# Patient Record
Sex: MALE | Race: WHITE
[De-identification: names, ages, dates, MRNs, and addresses within clinical notes are randomized per-mention and may not be internally consistent; named-entity substitution may affect disease eponyms.]

---

## 2017-12-20 NOTE — [place]
[facility]
                                    [address], [place]  [address]
_________________________________________________________________________________________
                                                                 Signed   
 
 
DATE OF OPERATION:
12/14/2017
 
SURGEON:
Sandra Zuñiga MD
 
PREOPERATIVE DIAGNOSIS:
Displaced fracture, proximal phalanx, left long finger.
 
POSTOPERATIVE DIAGNOSIS:
Displaced fracture, proximal phalanx, left long finger.
 
PROCEDURE:
Open reduction and internal fixation.
 
ANESTHESIA:
Heavy sedation with digital block.
 
SPECIMENS AND COMPLICATIONS:
There were no specimens or complications.
 
TOURNIQUET TIME:
About 45 minutes.
 
WHAT WAS DONE:
The patient was brought to the operating room.  After he was generously sedated, a 
digital block was performed with 0.25% Marcaine.  The arm was exsanguinated and pneumatic
tourniquet was elevated to 250 mmHg pressure.  A straight dorsal approach was made 
beginning at the MCP joint and extending distally to the PIP joint on the dorsal aspect 
of the third finger.  Skin was divided sharply.  Subcutaneous tissue was bluntly spread. 
The extensor tendon was split in the midline and gently retracted.  The fracture was then
identified and gently reduced under direct vision.  A 1.5 mm T-plate was then placed and 
two of the distal holes were cut off.  We then placed it over the bone and checked 
position fluoroscopically and we were quite happy.  We secured it proximally with several
screws and distally with several screws.  We then checked the rotational alignment of the
finger which appeared to be excellent as did the fluoroscopic appearance on AP, lateral 
and oblique imagery.  The wound was gently irrigated.  The extensor tendon was closed 
with 5-0 nylon and the skin was then closed with 4-0 nylon.  A sterile dressing was 
applied, over which a bulky hand dressing and a splint were placed.  He was awakened to 
the recovery room where arrived in stable condition.  Counts were correct and antibiotic 
protocols were followed.
 
 
    Electronically Signed By: SANDRA ZUÑIGA MD  12/20/17 1325
_________________________________________________________________________________________
PATIENT NAME:     GHASSAN ARANDA                         
MEDICAL RECORD #: [id number]                     OPERATIVE REPORT              
          ACCT #: [id number]  
DATE OF BIRTH:   [birth year]                                       
PHYSICIAN:   SANDRA ZUÑIGA MD           REPORT #: [id number]
REPORT IS CONFIDENTIAL AND NOT TO BE RELEASED WITHOUT AUTHORIZATION
 
 
                                  [address]  [address]
_________________________________________________________________________________________
                                                                 Signed   
 
 
 
 
________________________________________
 
 
Sandra Zuñiga MD 
 
 
[facility]
Job #:  [id number]
DD:  12/14/2017 09:26:40
DT:  12/14/2017 11:30:40
 
 
 
 
 
 
 
 
 
 
 
 
 
 
 
 
 
 
 
 
 
 
 
 
 
 
 
 
 
 
 
    Electronically Signed By: SANDRA ZUÑIGA MD  12/20/17 1325
_________________________________________________________________________________________
PATIENT NAME:     GHASSAN ARANDA                         
MEDICAL RECORD #: [id number]                     OPERATIVE REPORT              
          ACCT #: [id number]  
DATE OF BIRTH:   [birth year]                                       
PHYSICIAN:   SANDRA ZUÑIGA MD           REPORT #: [id number]
REPORT IS CONFIDENTIAL AND NOT TO BE RELEASED WITHOUT AUTHORIZATION

## 2022-09-25 ENCOUNTER — HOSPITAL ENCOUNTER (EMERGENCY)
Dept: HOSPITAL 46 - ED | Age: 32
Discharge: HOME | End: 2022-09-25
Payer: COMMERCIAL

## 2022-09-25 VITALS — HEIGHT: 72 IN | WEIGHT: 210.1 LBS | BODY MASS INDEX: 28.46 KG/M2

## 2022-09-25 DIAGNOSIS — Z23: ICD-10-CM

## 2022-09-25 DIAGNOSIS — S91.332A: Primary | ICD-10-CM
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
## 2022-09-25 DIAGNOSIS — F17.200: ICD-10-CM

## 2025-04-09 ENCOUNTER — HOSPITAL ENCOUNTER (EMERGENCY)
Dept: HOSPITAL 46 - ED | Age: 35
Discharge: ANOTHER HOSPITAL | End: 2025-04-09
Payer: COMMERCIAL

## 2025-04-09 VITALS — HEIGHT: 72 IN | WEIGHT: 263.68 LBS | BODY MASS INDEX: 35.71 KG/M2

## 2025-04-09 VITALS — DIASTOLIC BLOOD PRESSURE: 99 MMHG | SYSTOLIC BLOOD PRESSURE: 151 MMHG

## 2025-04-09 DIAGNOSIS — S02.42XB: ICD-10-CM

## 2025-04-09 DIAGNOSIS — S02.40DB: Primary | ICD-10-CM

## 2025-04-09 DIAGNOSIS — S02.609B: ICD-10-CM

## 2025-04-09 DIAGNOSIS — S02.85XB: ICD-10-CM

## 2025-04-09 DIAGNOSIS — F17.200: ICD-10-CM

## 2025-04-09 LAB
ABO GROUP BLD: (no result)
ALBUMIN SERPL-MCNC: 3.7 G/DL (ref 3.4–5)
ALBUMIN/GLOB SERPL: 1.03 {RATIO} (ref 1.1–2.4)
ALP SERPL-CCNC: 94 U/L (ref 46–116)
ALT SERPL W P-5'-P-CCNC: 44 U/L (ref 14–59)
ANION GAP SERPL CALCULATED.4IONS-SCNC: 8.1 MMOL/L (ref 7–21)
AST SERPL-CCNC: 25 U/L (ref 15–37)
BASOPHILS NFR BLD AUTO: 0.3 % (ref 0–2)
BUN SERPL-MCNC: 15 MG/DL (ref 7–18)
CALCIUM SERPL-MCNC: 9.1 MG/DL (ref 8.5–10.1)
CHLORIDE SERPL-SCNC: 105 MMOL/L (ref 98–107)
CK SERPL-CCNC: 129 U/L (ref 39–308)
CO2 SERPL-SCNC: 28 MMOL/L (ref 21–32)
DEPRECATED RDW RBC AUTO: 13.1 FL (ref 10.5–15)
EGFRCR SERPLBLD CKD-EPI 2021: 103 ML/MIN (ref 60–?)
EOSINOPHIL NFR BLD AUTO: 1.9 % (ref 0–6)
ETHANOL SERPL-SCNC: <3 NG/DL (ref ?–3)
GLOBULIN SER-MCNC: 3.6 G/DL (ref 1.8–3.5)
HCT VFR BLD AUTO: 44.2 % (ref 35–50)
HGB BLD-MCNC: 15.3 G/DL (ref 12–18)
IAT POLY-SP REAG SERPL QL: NEGATIVE
LYMPHOCYTES NFR BLD AUTO: 37.9 % (ref 24–44)
MCH RBC QN AUTO: 31.2 PG (ref 27–36)
MCHC RBC AUTO-ENTMCNC: 34.7 G/DL (ref 30–36)
MCV RBC AUTO: 89.9 FL (ref 81–99)
MONOCYTES NFR BLD AUTO: 6.9 % (ref 0–12)
PLATELET # BLD AUTO: 376 K/UL (ref 140–440)
POTASSIUM SERPL-SCNC: 4.1 MMOL/L (ref 3.5–5.1)
PROT SERPL-MCNC: 7.3 G/DL (ref 6.4–8.2)
RBC # BLD AUTO: 4.91 M/UL (ref 4.3–5.7)
RH BLD: NEGATIVE

## 2025-04-09 PROCEDURE — G0480 DRUG TEST DEF 1-7 CLASSES: HCPCS

## 2025-04-27 ENCOUNTER — HOSPITAL ENCOUNTER (EMERGENCY)
Dept: HOSPITAL 46 - ED | Age: 35
Discharge: HOME | End: 2025-04-27
Payer: COMMERCIAL

## 2025-04-27 VITALS — SYSTOLIC BLOOD PRESSURE: 132 MMHG | DIASTOLIC BLOOD PRESSURE: 92 MMHG

## 2025-04-27 VITALS — WEIGHT: 241.63 LBS | HEIGHT: 72 IN | BODY MASS INDEX: 32.73 KG/M2

## 2025-04-27 DIAGNOSIS — K29.70: Primary | ICD-10-CM

## 2025-04-27 DIAGNOSIS — F17.200: ICD-10-CM

## 2025-04-27 LAB
ALBUMIN SERPL-MCNC: 3.6 G/DL (ref 3.4–5)
ALBUMIN/GLOB SERPL: 0.82 {RATIO} (ref 1.1–2.4)
ANION GAP SERPL CALCULATED.4IONS-SCNC: 13.9 MMOL/L (ref 7–21)
BUN/CREAT SERPL: 12.37 (ref 6–28.6)
CALCIUM SERPL-MCNC: 9.2 MG/DL (ref 8.5–10.1)
DEPRECATED RDW RBC AUTO: 12.5 FL (ref 10.5–15)
EOSINOPHIL NFR BLD AUTO: 0.9 % (ref 0–6)
GLOBULIN SER-MCNC: 4.4 G/DL (ref 1.8–3.5)
HCT VFR BLD AUTO: 37.6 % (ref 35–50)
HGB BLD-MCNC: 13.1 G/DL (ref 12–18)
HGB UR QL STRIP: NEGATIVE
KETONES UR QL STRIP: (no result)
LEUKOCYTE ESTERASE UR QL STRIP: NEGATIVE
LYMPHOCYTES NFR BLD AUTO: 18.9 % (ref 24–44)
MCH RBC QN AUTO: 30.8 PG (ref 27–36)
MCHC RBC AUTO-ENTMCNC: 34.9 G/DL (ref 30–36)
MCV RBC AUTO: 88.4 FL (ref 81–99)
MONOCYTES NFR BLD AUTO: 7.2 % (ref 0–12)
NITRITE UR QL STRIP: NEGATIVE
PLATELET # BLD AUTO: 646 K/UL (ref 140–440)
POTASSIUM SERPL-SCNC: 3.9 MMOL/L (ref 3.5–5.1)
RBC # BLD AUTO: 4.26 M/UL (ref 4.3–5.7)